# Patient Record
Sex: FEMALE | URBAN - METROPOLITAN AREA
[De-identification: names, ages, dates, MRNs, and addresses within clinical notes are randomized per-mention and may not be internally consistent; named-entity substitution may affect disease eponyms.]

---
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
## 2025-07-09 NOTE — HPI: PIMPLES (ACNE)
Subjective:     Zenaida Blackwell is a 46 y.o. female who presents today with the following:  Chief Complaint   Patient presents with    Pharyngitis     With cough started Thursday       History of Present Illness  The patient is a 46-year-old female who presents with a sore throat and generally does not feel well.    She reports a persistent cough, which was particularly severe yesterday morning when she expectorated yellowish sputum. The onset of her symptoms was on Thursday, escalating over the weekend. She experiences pain on the right side of her throat during swallowing. She has been in close contact with her grandchild, who had a bacterial infection, and suspects this may be the source of her illness. She has been managing her symptoms with Tylenol and over-the-counter [place].    Patient Active Problem List   Diagnosis    Family history of malignant neoplasm of breast    Extreme insulin resistance type B    Snoring    Vitamin D deficiency    Stress incontinence    Dyslipidemia    Morbid obesity (HCC)    Chronic hypertension complicating or reason for care during pregnancy, third trimester    Vaginal bleeding in pregnancy, third trimester    Chronic hypertension affecting pregnancy    Maternal obesity syndrome, antepartum, third trimester    Polyhydramnios affecting pregnancy in third trimester             HM: addressed acute visit     ROS:  Gen: denies fever, chills, fatigue, weight loss, weight gain  HEENT:denies blurry vision, nasal congestion, sore throat  Resp: denies dypsnea, cough, wheezing  CV: denies chest pain radiating to the jaws or arms, palpitations, lower extremity edema  Abd: denies nausea, vomiting, diarrhea, constipation  Neuro: denies numbness/tingling  Endo: denies polyuria, polydipsia, heat/cold intolerance  Heme: no lymphadenopathy    No Known Allergies      Current Outpatient Medications:     azithromycin (ZITHROMAX) 250 MG tablet, 500mg on day 1 followed by 250mg on days 2 - 5, Disp: 
What Type Of Note Output Would You Prefer (Optional)?: Standard Output
\"Have you been to the ER, urgent care clinic since your last visit?  Hospitalized since your last visit?\"    NO    “Have you seen or consulted any other health care providers outside of [facility] since your last visit?”    NO    Have you had a mammogram?”   Yes [facility]    No breast cancer screening on file      “Have you had a pap smear?”    [facility]    No cervical cancer screening on file             Click Here for Release of Records Request      Chief Complaint   Patient presents with    Pharyngitis     With cough started Thursday         There were no vitals filed for this visit.  
How Severe Is Your Acne?: moderate
Is This A New Presentation, Or A Follow-Up?: Acne
Additional Comments (Use Complete Sentences): Pt also complains of a cystic bump on R cheek next to her ear lobe that has been present x 1 month. Using OTC face wash with witch hazel.